# Patient Record
Sex: MALE | Race: WHITE | NOT HISPANIC OR LATINO
[De-identification: names, ages, dates, MRNs, and addresses within clinical notes are randomized per-mention and may not be internally consistent; named-entity substitution may affect disease eponyms.]

---

## 2020-10-28 ENCOUNTER — APPOINTMENT (OUTPATIENT)
Dept: GASTROENTEROLOGY | Facility: CLINIC | Age: 65
End: 2020-10-28
Payer: MEDICARE

## 2020-10-28 VITALS
OXYGEN SATURATION: 95 % | HEART RATE: 99 BPM | HEIGHT: 70.5 IN | DIASTOLIC BLOOD PRESSURE: 74 MMHG | SYSTOLIC BLOOD PRESSURE: 138 MMHG | WEIGHT: 211 LBS | BODY MASS INDEX: 29.87 KG/M2

## 2020-10-28 DIAGNOSIS — R91.8 OTHER NONSPECIFIC ABNORMAL FINDING OF LUNG FIELD: ICD-10-CM

## 2020-10-28 DIAGNOSIS — Z12.11 ENCOUNTER FOR SCREENING FOR MALIGNANT NEOPLASM OF COLON: ICD-10-CM

## 2020-10-28 DIAGNOSIS — C61 MALIGNANT NEOPLASM OF PROSTATE: ICD-10-CM

## 2020-10-28 PROCEDURE — 99203 OFFICE O/P NEW LOW 30 MIN: CPT

## 2020-10-28 PROCEDURE — 99072 ADDL SUPL MATRL&STAF TM PHE: CPT

## 2020-10-28 RX ORDER — SODIUM SULFATE, POTASSIUM SULFATE, MAGNESIUM SULFATE 17.5; 3.13; 1.6 G/ML; G/ML; G/ML
17.5-3.13-1.6 SOLUTION, CONCENTRATE ORAL
Qty: 1 | Refills: 0 | Status: ACTIVE | COMMUNITY
Start: 2020-10-28 | End: 1900-01-01

## 2020-10-28 NOTE — HISTORY OF PRESENT ILLNESS
[de-identified] : Oct 28, 2020 \par \par Aneudy Weeks MD takes care of this very pleasant gentleman\par \par Mr. ENRRIQUE MEDRANO 65 year is referred for colon cancer screening.  The patient denies any change in bowel movements, blood per rectum, abdominal, pelvic or rectal discomfort.   \par \par There is no family history of colon cancer or other gastrointestinal cancers.\par \par The patient denies any unexplained weight loss, fever chills or night sweats.\par \par There is no significant cardiac or pulmonary history.\par \par No complaints of chest pain, shortness of breath, palpitations, cough.\par \par The patient is feeling quite well.\par \par The patient is on no significant anticoagulant therapy or anti platelet therapy. \par \par No adverse reaction to anesthesia in the past.\par \par

## 2020-10-28 NOTE — PHYSICAL EXAM
[General Appearance - Alert] : alert [General Appearance - In No Acute Distress] : in no acute distress [General Appearance - Well Nourished] : well nourished [General Appearance - Well Developed] : well developed [General Appearance - Well-Appearing] : healthy appearing [Sclera] : the sclera and conjunctiva were normal [Neck Appearance] : the appearance of the neck was normal [Neck Cervical Mass (___cm)] : no neck mass was observed [Jugular Venous Distention Increased] : there was no jugular-venous distention [Auscultation Breath Sounds / Voice Sounds] : lungs were clear to auscultation bilaterally [Apical Impulse] : the apical impulse was normal [Full Pulse] : the pedal pulses are present [Edema] : there was no peripheral edema [Bowel Sounds] : normal bowel sounds [Abdomen Soft] : soft [Abdomen Tenderness] : non-tender [] : no hepato-splenomegaly [Abdomen Mass (___ Cm)] : no abdominal mass palpated [Cervical Lymph Nodes Enlarged Posterior Bilaterally] : posterior cervical [Cervical Lymph Nodes Enlarged Anterior Bilaterally] : anterior cervical [Supraclavicular Lymph Nodes Enlarged Bilaterally] : supraclavicular [Axillary Lymph Nodes Enlarged Bilaterally] : axillary [Femoral Lymph Nodes Enlarged Bilaterally] : femoral [Inguinal Lymph Nodes Enlarged Bilaterally] : inguinal [No CVA Tenderness] : no ~M costovertebral angle tenderness [No Spinal Tenderness] : no spinal tenderness [Abnormal Walk] : normal gait [Nail Clubbing] : no clubbing  or cyanosis of the fingernails [Musculoskeletal - Swelling] : no joint swelling seen [Motor Tone] : muscle strength and tone were normal [No Focal Deficits] : no focal deficits [Oriented To Time, Place, And Person] : oriented to person, place, and time [Impaired Insight] : insight and judgment were intact [Affect] : the affect was normal

## 2020-11-13 ENCOUNTER — TRANSCRIPTION ENCOUNTER (OUTPATIENT)
Age: 65
End: 2020-11-13

## 2020-12-28 DIAGNOSIS — Z01.818 ENCOUNTER FOR OTHER PREPROCEDURAL EXAMINATION: ICD-10-CM

## 2020-12-31 ENCOUNTER — APPOINTMENT (OUTPATIENT)
Dept: DISASTER EMERGENCY | Facility: CLINIC | Age: 65
End: 2020-12-31

## 2021-01-01 LAB — SARS-COV-2 N GENE NPH QL NAA+PROBE: NOT DETECTED

## 2021-01-04 ENCOUNTER — APPOINTMENT (OUTPATIENT)
Dept: GASTROENTEROLOGY | Facility: AMBULATORY MEDICAL SERVICES | Age: 66
End: 2021-01-04
Payer: MEDICARE

## 2021-01-04 PROCEDURE — 45380 COLONOSCOPY AND BIOPSY: CPT

## 2021-01-06 ENCOUNTER — NON-APPOINTMENT (OUTPATIENT)
Age: 66
End: 2021-01-06

## 2021-01-12 ENCOUNTER — NON-APPOINTMENT (OUTPATIENT)
Age: 66
End: 2021-01-12

## 2024-04-15 ENCOUNTER — RESULT REVIEW (OUTPATIENT)
Age: 69
End: 2024-04-15

## 2024-04-16 ENCOUNTER — OUTPATIENT (OUTPATIENT)
Dept: OUTPATIENT SERVICES | Facility: HOSPITAL | Age: 69
LOS: 1 days | End: 2024-04-16
Payer: COMMERCIAL

## 2024-04-16 DIAGNOSIS — R93.1 ABNORMAL FINDINGS ON DIAGNOSTIC IMAGING OF HEART AND CORONARY CIRCULATION: ICD-10-CM

## 2024-04-16 DIAGNOSIS — R93.89 ABNORMAL FINDINGS ON DIAGNOSTIC IMAGING OF OTHER SPECIFIED BODY STRUCTURES: ICD-10-CM

## 2024-04-16 PROCEDURE — 93017 CV STRESS TEST TRACING ONLY: CPT

## 2024-04-16 PROCEDURE — 93016 CV STRESS TEST SUPVJ ONLY: CPT

## 2024-04-16 PROCEDURE — 93018 CV STRESS TEST I&R ONLY: CPT

## 2024-08-19 DIAGNOSIS — Z13.83 ENCOUNTER FOR SCREENING FOR RESPIRATORY DISORDER NEC: ICD-10-CM

## 2024-08-27 ENCOUNTER — APPOINTMENT (OUTPATIENT)
Dept: PULMONOLOGY | Facility: CLINIC | Age: 69
End: 2024-08-27
Payer: MEDICARE

## 2024-08-27 VITALS
BODY MASS INDEX: 31.92 KG/M2 | DIASTOLIC BLOOD PRESSURE: 89 MMHG | WEIGHT: 223 LBS | OXYGEN SATURATION: 93 % | TEMPERATURE: 97 F | HEART RATE: 83 BPM | SYSTOLIC BLOOD PRESSURE: 150 MMHG | HEIGHT: 70 IN | RESPIRATION RATE: 15 BRPM

## 2024-08-27 VITALS
SYSTOLIC BLOOD PRESSURE: 145 MMHG | DIASTOLIC BLOOD PRESSURE: 92 MMHG | WEIGHT: 223.7 LBS | HEIGHT: 70.87 IN | OXYGEN SATURATION: 97 % | HEART RATE: 84 BPM | BODY MASS INDEX: 31.32 KG/M2

## 2024-08-27 DIAGNOSIS — Z87.09 PERSONAL HISTORY OF OTHER DISEASES OF THE RESPIRATORY SYSTEM: ICD-10-CM

## 2024-08-27 DIAGNOSIS — I71.9 AORTIC ANEURYSM OF UNSPECIFIED SITE, W/OUT RUPTURE: ICD-10-CM

## 2024-08-27 PROCEDURE — 94729 DIFFUSING CAPACITY: CPT

## 2024-08-27 PROCEDURE — 94726 PLETHYSMOGRAPHY LUNG VOLUMES: CPT

## 2024-08-27 PROCEDURE — 99204 OFFICE O/P NEW MOD 45 MIN: CPT | Mod: 25

## 2024-08-27 PROCEDURE — 94010 BREATHING CAPACITY TEST: CPT

## 2024-08-27 PROCEDURE — ZZZZZ: CPT

## 2024-08-27 NOTE — HISTORY OF PRESENT ILLNESS
[Never] : never [TextBox_4] : This letter  is regarding your patient  who  attended pulmonary out patient office today.  I have reviewed  patient's  past history, social history, family history and medication list. I also  reviewed nurse practitioners/ and fellows  notes and assessment and agree with it.   The patient was referred by Dr. GAYTAN  69 yo retired from [employer] w/ h/o exposure to 9/11- followed by [facility] Reports that his w/u with [facility] has been normal- except once had lung nodule which is no longer present Reports hay fever, chronic sinusitis for which he takes loratadine and nasal sprays [facility] aortic aneurysm and prostate surgery 2008 No resp complaints- leads an active lifestyle- plays gof, runs, etc w/o any issue Lifelong nonsmoker  ------No history of , fever, chills , rigors, chest pain, or hemoptysis. Questionable history of Raynaud's phenomenon. No h/o significant weight loss in last few months. No history of liver dysfunction , collagen vascular disorder or chronic thromboembolic disease. I would classify the patient's dyspnea as WHO  FUNCTIONAL CLASS II--------  ----Echo  date------AS PER DR FLORES------ patient has normal ejection fraction and normal PASP but has dilated PA diameter of 3.6 cm ----Pft date--8/2024 normal study------- ----Ct scan date-----2024 splenomegaly-- --

## 2024-08-27 NOTE — DISCUSSION/SUMMARY
[FreeTextEntry1] : ---Assessment plan----------The patient has been referred here for further opinion regarding pulmonary problem, 67 yo retired from [employer] w/ h/o exposure to 9/11- followed by [facility] h/o sinusitis, 9/11 exposure, aortic aneurysm Echo  date------AS PER DR FLORES------ patient has normal ejection fraction and normal PASP but has dilated PA diameter of 3.6 cm   1) has features of OSA_ get HST 2) PFT is normal 3) echo report from cards-----Echo  date------AS PER DR FLORES------ patient has normal ejection fraction and normal PASP but has dilated PA diameter of 3.6 cm-----this possible his underlying sleep apnea came may explain the enlargement in the PA diameter in this patient  4) f/u in after sleep study   Thanks for allowing  me to participate  in the care of this patient.  Patient at this time  will follow  the above mentioned recommendations and return back for follow up visit. If you have any questions  I can be reached  at # [phone] (office). Joyce Muller MD, FCCP  Pulmonary, Critical Care and Sleep Medicine

## 2024-08-27 NOTE — DISCUSSION/SUMMARY
[FreeTextEntry1] : ---Assessment plan----------The patient has been referred here for further opinion regarding pulmonary problem, 67 yo retired from [employer] w/ h/o exposure to 9/11- followed by [facility] h/o sinusitis, 9/11 exposure, aortic aneurysm Echo  date------AS PER DR FLORES------ patient has normal ejection fraction and normal PASP but has dilated PA diameter of 3.6 cm   1) has features of OSA_ get HST 2) PFT is normal 3) echo report from cards-----Echo  date------AS PER DR FLORES------ patient has normal ejection fraction and normal PASP but has dilated PA diameter of 3.6 cm-----this possible his underlying sleep apnea came may explain the enlargement in the PA diameter in this patient  4) f/u in after sleep study   Thanks for allowing  me to participate  in the care of this patient.  Patient at this time  will follow  the above mentioned recommendations and return back for follow up visit. If you have any questions  I can be reached  at # [phone] (office). Joyce Muller MD, FCCP  Pulmonary, Critical Care and Sleep Medicine

## 2024-08-27 NOTE — HISTORY OF PRESENT ILLNESS
Pt called the GI office to schedule colonoscopy   Patient is scheduled for colonoscopy on December 2 , 2022 at [facility]  with [id number] [facility]  Ludmila Epstein Never] : never [TextBox_4] : This letter  is regarding your patient  who  attended pulmonary out patient office today.  I have reviewed  patient's  past history, social history, family history and medication list. I also  reviewed nurse practitioners/ and fellows  notes and assessment and agree with it.   The patient was referred by Dr. GAYTAN  67 yo retired from [employer] w/ h/o exposure to 9/11- followed by [facility] Reports that his w/u with [facility] has been normal- except once had lung nodule which is no longer present Reports hay fever, chronic sinusitis for which he takes loratadine and nasal sprays [facility] aortic aneurysm and prostate surgery 2008 No resp complaints- leads an active lifestyle- plays gof, runs, etc w/o any issue Lifelong nonsmoker  ------No history of , fever, chills , rigors, chest pain, or hemoptysis. Questionable history of Raynaud's phenomenon. No h/o significant weight loss in last few months. No history of liver dysfunction , collagen vascular disorder or chronic thromboembolic disease. I would classify the patient's dyspnea as WHO  FUNCTIONAL CLASS II--------  ----Echo  date------AS PER DR FLORES------ patient has normal ejection fraction and normal PASP but has dilated PA diameter of 3.6 cm ----Pft date--8/2024 normal study------- ----Ct scan date-----2024 splenomegaly-- --

## 2024-08-27 NOTE — END OF VISIT
[FreeTextEntry3] :   I, Dr. Joyce Muller  personally performed the evaluation and management (E/M) services for this established patient who presents today with (a) new problem(s)/exacerbation of (an) existing condition(s). That E/M includes conducting the clinically appropriate interval history &/or exam, assessing all new/exacerbated conditions, and establishing a new plan of care. Today, my STEVIE, Rhonda Mccloud NP, , was here to observe my evaluation and management service for this new problem/exacerbated condition and follow the plan of care established by me going forward. [Time Spent: ___ minutes] : I have spent [unfilled] minutes of time on the encounter which excludes teaching and separately reported services.

## 2024-10-01 ENCOUNTER — OUTPATIENT (OUTPATIENT)
Dept: OUTPATIENT SERVICES | Facility: HOSPITAL | Age: 69
LOS: 1 days | End: 2024-10-01
Payer: COMMERCIAL

## 2024-10-01 ENCOUNTER — APPOINTMENT (OUTPATIENT)
Dept: SLEEP CENTER | Facility: CLINIC | Age: 69
End: 2024-10-01
Payer: MEDICARE

## 2024-10-01 PROCEDURE — 95800 SLP STDY UNATTENDED: CPT | Mod: 26

## 2024-10-01 PROCEDURE — 95800 SLP STDY UNATTENDED: CPT

## 2024-10-07 DIAGNOSIS — G47.33 OBSTRUCTIVE SLEEP APNEA (ADULT) (PEDIATRIC): ICD-10-CM

## 2024-10-18 DIAGNOSIS — G47.33 OBSTRUCTIVE SLEEP APNEA (ADULT) (PEDIATRIC): ICD-10-CM
